# Patient Record
Sex: FEMALE
[De-identification: names, ages, dates, MRNs, and addresses within clinical notes are randomized per-mention and may not be internally consistent; named-entity substitution may affect disease eponyms.]

---

## 2019-12-30 ENCOUNTER — APPOINTMENT (OUTPATIENT)
Dept: ORTHOPEDIC SURGERY | Facility: CLINIC | Age: 59
End: 2019-12-30
Payer: COMMERCIAL

## 2019-12-30 VITALS
HEIGHT: 65 IN | DIASTOLIC BLOOD PRESSURE: 70 MMHG | HEART RATE: 86 BPM | SYSTOLIC BLOOD PRESSURE: 126 MMHG | BODY MASS INDEX: 21.33 KG/M2 | WEIGHT: 128 LBS

## 2019-12-30 DIAGNOSIS — Z80.1 FAMILY HISTORY OF MALIGNANT NEOPLASM OF TRACHEA, BRONCHUS AND LUNG: ICD-10-CM

## 2019-12-30 DIAGNOSIS — Z78.9 OTHER SPECIFIED HEALTH STATUS: ICD-10-CM

## 2019-12-30 DIAGNOSIS — Z80.0 FAMILY HISTORY OF MALIGNANT NEOPLASM OF DIGESTIVE ORGANS: ICD-10-CM

## 2019-12-30 DIAGNOSIS — Z86.39 PERSONAL HISTORY OF OTHER ENDOCRINE, NUTRITIONAL AND METABOLIC DISEASE: ICD-10-CM

## 2019-12-30 DIAGNOSIS — S52.532A COLLES' FRACTURE OF LEFT RADIUS, INITIAL ENCOUNTER FOR CLOSED FRACTURE: ICD-10-CM

## 2019-12-30 DIAGNOSIS — Z56.0 UNEMPLOYMENT, UNSPECIFIED: ICD-10-CM

## 2019-12-30 PROBLEM — Z00.00 ENCOUNTER FOR PREVENTIVE HEALTH EXAMINATION: Status: ACTIVE | Noted: 2019-12-30

## 2019-12-30 PROCEDURE — 99204 OFFICE O/P NEW MOD 45 MIN: CPT | Mod: 57

## 2019-12-30 PROCEDURE — 25600 CLTX DST RDL FX/EPHYS SEP WO: CPT | Mod: LT

## 2019-12-30 RX ORDER — INSULIN DEGLUDEC INJECTION 100 U/ML
100 INJECTION, SOLUTION SUBCUTANEOUS
Refills: 0 | Status: ACTIVE | COMMUNITY

## 2019-12-30 RX ORDER — SIMVASTATIN 10 MG/1
10 TABLET, FILM COATED ORAL
Refills: 0 | Status: ACTIVE | COMMUNITY

## 2019-12-30 RX ORDER — ASPIRIN 325 MG/1
TABLET, FILM COATED ORAL
Refills: 0 | Status: ACTIVE | COMMUNITY

## 2019-12-30 RX ORDER — INSULIN LISPRO 100 [IU]/ML
100 INJECTION, SOLUTION INTRAVENOUS; SUBCUTANEOUS
Refills: 0 | Status: ACTIVE | COMMUNITY

## 2019-12-30 RX ORDER — ACETAMINOPHEN 325 MG/1
TABLET, FILM COATED ORAL
Refills: 0 | Status: ACTIVE | COMMUNITY

## 2019-12-30 RX ORDER — LEVOTHYROXINE SODIUM 0.05 MG/1
50 TABLET ORAL
Refills: 0 | Status: ACTIVE | COMMUNITY

## 2019-12-30 SDOH — ECONOMIC STABILITY - INCOME SECURITY: UNEMPLOYMENT, UNSPECIFIED: Z56.0

## 2019-12-30 NOTE — PHYSICAL EXAM
[Bicep] : biceps 2+ and symmetric bilaterally [UE] : Sensory: Intact in bilateral upper extremities [Tricep] : triceps 2+ and symmetric bilaterally [B.R.] : biceps 2+ and symmetric bilaterally [Rad] : radial 2+ and symmetric bilaterally [Normal] : Alert and in no acute distress [Poor Appearance] : well-appearing [Obese] : not obese [Acute Distress] : not in acute distress [de-identified] : The patient has no respiratory distress. Mood and affect are normal. The patient is alert and oriented to person, place and time.\par Examination of the shoulders demonstrates no tenderness.  There is no pain with shoulder motion.  There is tenderness and mild swelling at the left wrist.  There is minimal ecchymosis.  There is pain with all motions of the wrist.  Tendon function is intact.  There is no lymphedema. [de-identified] : X-rays performed at [facility] 12/28/19 show mildly displaced, dorsally angulated comminuted fracture of the distal radius.

## 2019-12-30 NOTE — DISCUSSION/SUMMARY
[de-identified] : The patient has a slightly dorsally displaced fracture of the left distal radius.  I have discussed the pathology, natural history and treatment options with her.  Although she does have slight dorsal displacement the position is acceptable.  She does not feel she requires operative fixation.  She is placed in a short arm cast.  Instructions are given.  She will be reevaluated for repeat x-rays in 1 week.

## 2019-12-31 ENCOUNTER — APPOINTMENT (OUTPATIENT)
Dept: ORTHOPEDIC SURGERY | Facility: CLINIC | Age: 59
End: 2019-12-31
Payer: COMMERCIAL

## 2019-12-31 VITALS
WEIGHT: 128 LBS | BODY MASS INDEX: 21.33 KG/M2 | HEART RATE: 82 BPM | HEIGHT: 65 IN | DIASTOLIC BLOOD PRESSURE: 72 MMHG | SYSTOLIC BLOOD PRESSURE: 135 MMHG

## 2019-12-31 DIAGNOSIS — S52.532D COLLES' FRACTURE OF LEFT RADIUS, SUBSEQUENT ENCOUNTER FOR CLOSED FRACTURE WITH ROUTINE HEALING: ICD-10-CM

## 2019-12-31 PROCEDURE — 99024 POSTOP FOLLOW-UP VISIT: CPT

## 2019-12-31 NOTE — DISCUSSION/SUMMARY
[de-identified] : The patient's cast is bivalved with immediate relief of all symptoms.  She is very comfortable in the bivalved cast.  Ace bandages applied loosely.  She is advised to loosen the Ace bandage as needed.  She will be seen in 6 days or sooner if she has further difficulties.

## 2019-12-31 NOTE — PHYSICAL EXAM
[Bicep] : biceps 2+ and symmetric bilaterally [UE] : 5/5 motor strength in bilateral upper extremities [B.R.] : biceps 2+ and symmetric bilaterally [Tricep] : triceps 2+ and symmetric bilaterally [Poor Appearance] : well-appearing [Normal] : Alert and in no acute distress [Rad] : radial 2+ and symmetric bilaterally [de-identified] : The patient has no respiratory distress. Mood and affect are normal. The patient is alert and oriented to person, place and time.\par Examination of the shoulders demonstrates no tenderness.  There is no pain with shoulder motion.  Left upper extremity short arm cast is intact.  There is swelling of the fingers.  She has pain with motion of the fingers.  Tendon function is intact. [Acute Distress] : not in acute distress [Obese] : not obese

## 2019-12-31 NOTE — HISTORY OF PRESENT ILLNESS
[de-identified] : Patient returns to office 1 day status post short arm cast placement for left distal radius Colles fracture. She is complaining of the cast "being too tight". She denies any onset of numbness or tingling in the cast, but says she has noticed her wrist and hand swelling as having increased slightly.

## 2020-01-06 ENCOUNTER — APPOINTMENT (OUTPATIENT)
Dept: ORTHOPEDIC SURGERY | Facility: CLINIC | Age: 60
End: 2020-01-06

## 2021-07-19 NOTE — HISTORY OF PRESENT ILLNESS
19-Jul-2021 20:45 [de-identified] : Ms. MU PUENTE is a 59 year female who presents to office complaining of left wrist pain since falling onto an outstretched hand while ice skating on 12/28/19.\par Patient has Type 1 DM.\par Patient says her pain has slightly improved since the date of injury. She also has some noted swelling of the wrist.\par She denies any other associated symptoms such as numbness/tingling, etc.\par She went to [facility] that same day where x-rays were performed.\par She was placed in a volar wrist splint wrapped with ACE bandages and has had this on the wrist since placed on by urgent care.\par All review of systems, family history, social history, surgical history, past medical history, medications, and allergies not previously stated as positive are negative. They were reviewed by me today with the patient and documented accordingly.